# Patient Record
Sex: FEMALE | Race: WHITE | NOT HISPANIC OR LATINO | Employment: UNEMPLOYED | URBAN - METROPOLITAN AREA
[De-identification: names, ages, dates, MRNs, and addresses within clinical notes are randomized per-mention and may not be internally consistent; named-entity substitution may affect disease eponyms.]

---
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
## 2023-01-05 DIAGNOSIS — E06.3 HASHIMOTO'S THYROIDITIS: Primary | ICD-10-CM

## 2023-03-16 ENCOUNTER — LAB (OUTPATIENT)
Dept: LAB | Facility: CLINIC | Age: 18
End: 2023-03-16
Payer: COMMERCIAL

## 2023-03-16 DIAGNOSIS — E06.3 HASHIMOTO'S THYROIDITIS: ICD-10-CM

## 2023-03-16 LAB
BASOPHILS # BLD AUTO: 0 10E3/UL (ref 0–0.2)
BASOPHILS NFR BLD AUTO: 1 %
EOSINOPHIL # BLD AUTO: 0.3 10E3/UL (ref 0–0.7)
EOSINOPHIL NFR BLD AUTO: 4 %
ERYTHROCYTE [DISTWIDTH] IN BLOOD BY AUTOMATED COUNT: 14.3 % (ref 10–15)
HCT VFR BLD AUTO: 38.4 % (ref 35–47)
HGB BLD-MCNC: 11.6 G/DL (ref 11.7–15.7)
IMM GRANULOCYTES # BLD: 0 10E3/UL
IMM GRANULOCYTES NFR BLD: 0 %
LYMPHOCYTES # BLD AUTO: 1.9 10E3/UL (ref 0.8–5.3)
LYMPHOCYTES NFR BLD AUTO: 30 %
MCH RBC QN AUTO: 24.4 PG (ref 26.5–33)
MCHC RBC AUTO-ENTMCNC: 30.2 G/DL (ref 31.5–36.5)
MCV RBC AUTO: 81 FL (ref 78–100)
MONOCYTES # BLD AUTO: 0.4 10E3/UL (ref 0–1.3)
MONOCYTES NFR BLD AUTO: 6 %
NEUTROPHILS # BLD AUTO: 3.6 10E3/UL (ref 1.6–8.3)
NEUTROPHILS NFR BLD AUTO: 59 %
PLATELET # BLD AUTO: 297 10E3/UL (ref 150–450)
RBC # BLD AUTO: 4.75 10E6/UL (ref 3.8–5.2)
T4 FREE SERPL-MCNC: 1.06 NG/DL (ref 1–1.6)
TSH SERPL DL<=0.005 MIU/L-ACNC: 6.25 UIU/ML (ref 0.5–4.3)
WBC # BLD AUTO: 6.2 10E3/UL (ref 4–11)

## 2023-03-16 PROCEDURE — 84443 ASSAY THYROID STIM HORMONE: CPT

## 2023-03-16 PROCEDURE — 84439 ASSAY OF FREE THYROXINE: CPT

## 2023-03-16 PROCEDURE — 36415 COLL VENOUS BLD VENIPUNCTURE: CPT

## 2023-03-16 PROCEDURE — 85025 COMPLETE CBC W/AUTO DIFF WBC: CPT

## 2023-03-24 NOTE — RESULT ENCOUNTER NOTE
Jaime,  Your recent thyroid tests have moved in the opposite direction of what I expected (and desired) following the dose increase.  Are you sure you are taking your dose consistently, each day?  This would be a reason for your TSH level to increase even though we increased your dose.  Alternatively, if you changed the time that you take it or are now eating when taking the dose or taking a vitamin or other medication that can affect its absorption would all be other explanations.  Its not too far out of range, but before I increase your dose again, I want to be sure nothing has changed in your routine.  I dont want to put you on a dose that is too much.  If you have missed doses here and there, lets keep you on the same dose but use a pill counter and focus on consistent administration.  If you skip a day, double up the next.      Let me know.      Dr. Carter

## 2023-05-07 ENCOUNTER — HEALTH MAINTENANCE LETTER (OUTPATIENT)
Age: 18
End: 2023-05-07

## 2023-06-05 ENCOUNTER — MYC MEDICAL ADVICE (OUTPATIENT)
Dept: ENDOCRINOLOGY | Facility: CLINIC | Age: 18
End: 2023-06-05
Payer: COMMERCIAL

## 2023-06-05 DIAGNOSIS — E61.1 IRON DEFICIENCY: Primary | ICD-10-CM

## 2023-06-05 DIAGNOSIS — E06.3 HASHIMOTO'S THYROIDITIS: ICD-10-CM

## 2023-06-13 ENCOUNTER — LAB (OUTPATIENT)
Dept: LAB | Facility: CLINIC | Age: 18
End: 2023-06-13
Payer: COMMERCIAL

## 2023-06-13 DIAGNOSIS — E06.3 HASHIMOTO'S THYROIDITIS: ICD-10-CM

## 2023-06-13 DIAGNOSIS — E61.1 IRON DEFICIENCY: ICD-10-CM

## 2023-06-13 LAB
BASOPHILS # BLD AUTO: 0 10E3/UL (ref 0–0.2)
BASOPHILS NFR BLD AUTO: 1 %
EOSINOPHIL # BLD AUTO: 0.2 10E3/UL (ref 0–0.7)
EOSINOPHIL NFR BLD AUTO: 4 %
ERYTHROCYTE [DISTWIDTH] IN BLOOD BY AUTOMATED COUNT: 14.5 % (ref 10–15)
FERRITIN SERPL-MCNC: 12 NG/ML (ref 6–175)
HCT VFR BLD AUTO: 40.1 % (ref 35–47)
HGB BLD-MCNC: 12.1 G/DL (ref 11.7–15.7)
IMM GRANULOCYTES # BLD: 0 10E3/UL
IMM GRANULOCYTES NFR BLD: 0 %
IRON BINDING CAPACITY (ROCHE): 420 UG/DL (ref 240–430)
IRON SATN MFR SERPL: 6 % (ref 15–46)
IRON SERPL-MCNC: 26 UG/DL (ref 37–145)
LYMPHOCYTES # BLD AUTO: 2.1 10E3/UL (ref 0.8–5.3)
LYMPHOCYTES NFR BLD AUTO: 33 %
MCH RBC QN AUTO: 25.1 PG (ref 26.5–33)
MCHC RBC AUTO-ENTMCNC: 30.2 G/DL (ref 31.5–36.5)
MCV RBC AUTO: 83 FL (ref 78–100)
MONOCYTES # BLD AUTO: 0.5 10E3/UL (ref 0–1.3)
MONOCYTES NFR BLD AUTO: 7 %
NEUTROPHILS # BLD AUTO: 3.5 10E3/UL (ref 1.6–8.3)
NEUTROPHILS NFR BLD AUTO: 55 %
NRBC # BLD AUTO: 0 10E3/UL
NRBC BLD AUTO-RTO: 0 /100
PLATELET # BLD AUTO: 257 10E3/UL (ref 150–450)
RBC # BLD AUTO: 4.83 10E6/UL (ref 3.8–5.2)
T4 FREE SERPL-MCNC: 1.21 NG/DL (ref 1–1.6)
TSH SERPL DL<=0.005 MIU/L-ACNC: 4.74 UIU/ML (ref 0.5–4.3)
WBC # BLD AUTO: 6.3 10E3/UL (ref 4–11)

## 2023-06-13 PROCEDURE — 84443 ASSAY THYROID STIM HORMONE: CPT

## 2023-06-13 PROCEDURE — 82728 ASSAY OF FERRITIN: CPT

## 2023-06-13 PROCEDURE — 85025 COMPLETE CBC W/AUTO DIFF WBC: CPT

## 2023-06-13 PROCEDURE — 83540 ASSAY OF IRON: CPT

## 2023-06-13 PROCEDURE — 36415 COLL VENOUS BLD VENIPUNCTURE: CPT

## 2023-06-13 PROCEDURE — 83550 IRON BINDING TEST: CPT

## 2023-06-13 PROCEDURE — 84439 ASSAY OF FREE THYROXINE: CPT

## 2023-06-15 DIAGNOSIS — E06.3 HASHIMOTO'S THYROIDITIS: ICD-10-CM

## 2023-06-15 DIAGNOSIS — E61.1 IRON DEFICIENCY: ICD-10-CM

## 2023-06-15 RX ORDER — LEVOTHYROXINE SODIUM 137 UG/1
137 TABLET ORAL DAILY
Qty: 90 TABLET | Refills: 3 | Status: SHIPPED | OUTPATIENT
Start: 2023-06-15 | End: 2024-01-04

## 2023-06-15 RX ORDER — PNV NO.95/FERROUS FUM/FOLIC AC 28MG-0.8MG
1 TABLET ORAL DAILY
Qty: 90 TABLET | Refills: 1 | Status: SHIPPED | OUTPATIENT
Start: 2023-06-15

## 2023-06-15 NOTE — RESULT ENCOUNTER NOTE
Jaime,    Your thyroid levels are trending in the right direction on the 137 mcg tablet.  They are in a range I am comfortable continuing this dose.      Your iron levels have fallen again and are in a low range.  Although you do not have evidence for anemia at this point, that could occur without adequate replacement of your iron.  I would like for you to take a daily iron supplement for the next 3 months and then recheck your labs.  This could also be done before you leave for school or if you are home for the Thanksgiving holiday.  I placed orders for you.  I sent an iron prescription that I would like you to take to your pharmacy.  Please take it at the opposite time of day from your thyroid hormone.    Dr. Carter

## 2023-12-06 ENCOUNTER — TELEPHONE (OUTPATIENT)
Dept: ENDOCRINOLOGY | Facility: CLINIC | Age: 18
End: 2023-12-06

## 2023-12-13 DIAGNOSIS — E61.1 IRON DEFICIENCY: ICD-10-CM

## 2023-12-13 RX ORDER — PNV NO.95/FERROUS FUM/FOLIC AC 28MG-0.8MG
1 TABLET ORAL DAILY
Qty: 90 TABLET | Refills: 1 | OUTPATIENT
Start: 2023-12-13

## 2023-12-13 NOTE — TELEPHONE ENCOUNTER
Refill request received from: [facility]  Medication Requested:  Iron 65 mg tablet  Directions:1 tablet by mouth daily  Quantity:90  Last Office Visit: 7/22/2022  Next Appointment Scheduled for: 1/4/2024  Last refill:   Sent To:  RN or Provider

## 2024-01-01 ENCOUNTER — VIRTUAL VISIT (OUTPATIENT)
Dept: URGENT CARE | Facility: CLINIC | Age: 19
End: 2024-01-01
Payer: COMMERCIAL

## 2024-01-01 DIAGNOSIS — H10.9 BACTERIAL CONJUNCTIVITIS OF RIGHT EYE: Primary | ICD-10-CM

## 2024-01-01 PROCEDURE — 99203 OFFICE O/P NEW LOW 30 MIN: CPT | Mod: 95

## 2024-01-01 RX ORDER — POLYMYXIN B SULFATE AND TRIMETHOPRIM 1; 10000 MG/ML; [USP'U]/ML
1-2 SOLUTION OPHTHALMIC EVERY 4 HOURS
Qty: 10 ML | Refills: 0 | Status: SHIPPED | OUTPATIENT
Start: 2024-01-01

## 2024-01-01 NOTE — PATIENT INSTRUCTIONS
Use antibiotic eye drops as directed for 7 days.  Wipe away drainage before administering eye drops.  Wipe discharge away with wet paper towel and then discard.   Discharge should clear up in 72 hours; redness may continue several more days.  Out of activities for at least 24 hrs due to being contagious.  Do not wear make up until symptoms resolve. Throw away all make up used 24 hours prior to symptom onset.   Infection is spread by contact. Avoid touching eye as much as possible to avoid spreading the infection.  Wash hands regularly and sanitize items touched.  Wash previously used bath, face and hand towels. Do not share towels with others.  Wash pillow case and linens.   Go to urgent care or ER if eye pain, vision changes occur or sensitivity to light, fever/sweats/chills develop.

## 2024-01-01 NOTE — PROGRESS NOTES
Jaime is a 18 year old female who presents for a billable video visit.    ASSESSMENT/PLAN:  Diagnoses and all orders for this visit:    Bacterial conjunctivitis of right eye  -     polymixin b-trimethoprim (POLYTRIM) 08236-7.1 UNIT/ML-% ophthalmic solution; Place 1-2 drops into the right eye every 4 hours        Patient Instructions   Use antibiotic eye drops as directed for 7 days.  Wipe away drainage before administering eye drops.  Wipe discharge away with wet paper towel and then discard.   Discharge should clear up in 72 hours; redness may continue several more days.  Out of activities for at least 24 hrs due to being contagious.  Do not wear make up until symptoms resolve. Throw away all make up used 24 hours prior to symptom onset.   Infection is spread by contact. Avoid touching eye as much as possible to avoid spreading the infection.  Wash hands regularly and sanitize items touched.  Wash previously used bath, face and hand towels. Do not share towels with others.  Wash pillow case and linens.   Go to urgent care or ER if eye pain, vision changes occur or sensitivity to light, fever/sweats/chills develop.      SUBJECTIVE:  Patient reports that she has had cold symptoms for a few days of nasal congestion & cough. Yesterday she took a nap, when she woke upp she had some itching, drainage and crusting of the eyelashes of the right eye, drainage. The right eye is red. No pain in the eye, no vision changes, no sensitivity to light or no fever/sweats or chills.    OBJECTIVE:  Vitals not done due to this being a virtual visit    GENERAL: Healthy, alert and no distress  EYES: Left Eye grossly normal to inspection.  No discharge or erythema, or obvious scleral/conjunctival abnormalities. Right eye conjunctivae erythematous.   RESP: No audible wheeze, cough, or visible cyanosis.  No visible retractions or increased work of breathing.    SKIN: Visible skin clear. No significant rash, abnormal pigmentation or  lesions.  NEURO: Cranial nerves grossly intact.  Mentation and speech appropriate for age.  PSYCH: Mentation appears normal, affect normal/bright, judgement and insight intact, normal speech and appearance well-groomed.        Video-Visit Details    Type of service:  Video Visit  Video Start Time: 5:33PM  Video End Time:5:39 PM    Originating Location (pt. Location): Home    Distant Location (provider location):  [facility] URGENT CARE     Platform used for Video Visit: SHOSHANA Courtney, CNP

## 2024-01-04 ENCOUNTER — VIRTUAL VISIT (OUTPATIENT)
Dept: ENDOCRINOLOGY | Facility: CLINIC | Age: 19
End: 2024-01-04
Payer: COMMERCIAL

## 2024-01-04 VITALS — HEIGHT: 69 IN | WEIGHT: 210 LBS | BODY MASS INDEX: 31.1 KG/M2

## 2024-01-04 DIAGNOSIS — E61.1 IRON DEFICIENCY: Primary | ICD-10-CM

## 2024-01-04 DIAGNOSIS — E06.3 HASHIMOTO'S THYROIDITIS: ICD-10-CM

## 2024-01-04 PROCEDURE — 99214 OFFICE O/P EST MOD 30 MIN: CPT | Mod: 95 | Performed by: PEDIATRICS

## 2024-01-04 RX ORDER — LEVOTHYROXINE SODIUM 137 UG/1
137 TABLET ORAL DAILY
Qty: 90 TABLET | Refills: 3 | Status: SHIPPED | OUTPATIENT
Start: 2024-01-04

## 2024-01-04 ASSESSMENT — PAIN SCALES - GENERAL: PAINLEVEL: NO PAIN (0)

## 2024-01-04 NOTE — PATIENT INSTRUCTIONS
1.  Move thyroid hormone administration to right before you go to bed.  2.  Be consistent with taking of iron and sertraline  3. Follow-up labs over presidents day weekend (orders entered)  4. New prescription for 1 year  5. Follow-up with therapist once you are back at school  6.  Follow-up with me in 1 year.  Plan to transition to adult provider to manage thyroid during or just after senior year.  Will need to establish care with adult primary care as well.

## 2024-01-04 NOTE — PROGRESS NOTES
Pediatric Endocrinology Follow-up Visit    Patient: Jaime Delatorre MRN# [id number]   YOB: 2005 Age: 18year 1month old   Date of Visit: Jan 4, 2024    Dear Dr. Duane C Skar:    I had the pleasure of seeing your patient, Jaime Delatorre for a video visit in the Pediatric Endocrinology Clinic, [facility], on Jan 4, 2024 for follow-up of Hashimotos thyroiditis.           Problem list:     Patient Active Problem List    Diagnosis Date Noted    Recurrent major depressive disorder, in partial remission (H24)      Priority: Medium    Anxiety      Priority: Medium            HPI:   My initial consultation with Jaime was in January of 2021 (virtual).  She was seen for depression visit and started on SSRI back in November.  Had laboratory tests done as part of a routine screen as noted below.  She had improvement on zoloft including some improvement of her fatigue.  She had no other symptoms of hypothyroidism at that time.    I did some additional screens and idenitfied that she had evidence for autoimmune thyroiditis.  I started her at 75 mcg and increased her to 88 mcg in March after her labs showed a persistent elevation in TSH.  Her tests from June showed a further increase in her TSH and I bumped her to 100 mcg in mid June.  At our last visit, due to continued symptoms and hyperthryotropinemia from her labs in January of 2022, I increased her further to 112 mcg daily.  She has a history for palpitations thought it was not clear if these were related to anxiety rather than her thyroid levels.    She struggles to find the right time to take her medication.  For the past few months, she has not been on a consistent schedule.  She feels this has led to inconsistency to exposure.  She is noticing more fatigue and weight gain.  Reports that her fingers get tingling or numb intermittently.  She is taking sertraline, levothyroxine, and iron and will have symptoms if she doesn't take her meds.  When consistent  "this does not seem to happen.  Menses are regular.  Was on OCP but off for past two months.  Self weaned it.  She wants to see how she does off of it.  No cold intolerance.  No skin problems.  No constipation.  No neck symptoms.         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          Family History:   Father is  6 feet 1 inch tall.  Mother is  5 feet 2 inches tall.      No family history on file.    History of:  Thyroid disease: Mat Gma on thyroid since teenager; mom diagnosed with hypothyroidism in late 30s - had a goiter; maternal aunt with hypothyroidism  Celiac disease: None  No T1D  T2D: grandmother         Allergies:     Allergies   Allergen Reactions    Seasonal Allergies              Medications:     Current Outpatient Medications   Medication Sig Dispense Refill    Ferrous Sulfate (IRON) 325 (65 Fe) MG tablet Take 1 tablet by mouth daily 90 tablet 1    JUNEL FE 1.5/30 1.5-30 MG-MCG tablet Take 1 tablet by mouth daily      levothyroxine (SYNTHROID/LEVOTHROID) 137 MCG tablet Take 1 tablet (137 mcg) by mouth daily 90 tablet 3    polymixin b-trimethoprim (POLYTRIM) 85505-8.1 UNIT/ML-% ophthalmic solution Place 1-2 drops into the right eye every 4 hours 10 mL 0    sertraline (ZOLOFT) 25 MG tablet 1 TABLET (IN ADDITION TO 50MG TAB TO = 75MG) ORALLY ONCE A DAY 30 DAY(S)      sertraline (ZOLOFT) 50 MG tablet Take 50 mg by mouth daily             Review of Systems:   Gen: negative  Eye: Negative  ENT: Negative  Pulmonary:  Negative  Cardio: palpitations  Gastrointestinal: Negative  Hematologic: Negative  Genitourinary: Negative  Musculoskeletal: Negative  Psychiatric: Negative  Neurologic: no tremors  Skin: Negative  Endocrine: see HPI.            Physical Exam:   Height 1.753 m (5' 9.02\"), weight 95.3 kg (210 lb).  Growth %ile SmartLinks can only be used for patients less than 20 years " "old.  Height: 175.3 cm  (0\") Facility age limit for growth %justina is 20 years.  Weight: 95.3 kg (actual weight), Facility age limit for growth %justina is 20 years.  BMI: Body mass index is 31 kg/m . Facility age limit for growth %justina is 20 years.      GENERAL: healthy, alert, and crying during exam  EYES: Eyes grossly normal to inspection.  No discharge or erythema, or obvious scleral/conjunctival abnormalities.  RESP: No audible wheeze, cough, or visible cyanosis.  No visible retractions or increased work of breathing.    SKIN: Visible skin clear. No significant rash, abnormal pigmentation or lesions.  NEURO: Cranial nerves grossly intact.  Mentation and speech appropriate for age.  PSYCH: Mentation appears normal, affect normal/bright, judgement and insight intact, normal speech and appearance well-groomed.         Laboratory results:      Latest Reference Range & Units 06/13/23 15:36   Ferritin 6 - 175 ng/mL 12   Iron 37 - 145 ug/dL 26 (L)   Iron Binding Capacity 240 - 430 ug/dL 420   Iron Sat Index 15 - 46 % 6 (L)   T4 Free 1.00 - 1.60 ng/dL 1.21   TSH 0.50 - 4.30 uIU/mL 4.74 (H)   WBC 4.0 - 11.0 10e3/uL 6.3   Hemoglobin 11.7 - 15.7 g/dL 12.1   Hematocrit 35.0 - 47.0 % 40.1   Platelet Count 150 - 450 10e3/uL 257   RBC Count 3.80 - 5.20 10e6/uL 4.83   MCV 78 - 100 fL 83   MCH 26.5 - 33.0 pg 25.1 (L)   MCHC 31.5 - 36.5 g/dL 30.2 (L)   RDW 10.0 - 15.0 % 14.5   % Neutrophils % 55   % Lymphocytes % 33   % Monocytes % 7   % Eosinophils % 4   % Basophils % 1   Absolute Basophils 0.0 - 0.2 10e3/uL 0.0   Absolute Eosinophils 0.0 - 0.7 10e3/uL 0.2   Absolute Immature Granulocytes <=0.4 10e3/uL 0.0   Absolute Lymphocytes 0.8 - 5.3 10e3/uL 2.1   Absolute Monocytes 0.0 - 1.3 10e3/uL 0.5   % Immature Granulocytes % 0   Absolute Neutrophils 1.6 - 8.3 10e3/uL 3.5   Absolute NRBCs 10e3/uL 0.0   NRBCs per 100 WBC <1 /100 0   (L): Data is abnormally low  (H): Data is abnormally high    TSH   Date Value Ref Range Status   06/13/2023 " 4.74 (H) 0.50 - 4.30 uIU/mL Final   03/16/2023 6.25 (H) 0.50 - 4.30 uIU/mL Final   11/23/2022 5.81 (H) 0.50 - 4.30 uIU/mL Final   01/17/2022 5.88 (H) 0.40 - 4.00 mU/L Final   08/29/2021 5.59 (H) 0.40 - 4.00 mU/L Final   06/07/2021 12.70 (H) 0.40 - 4.00 mU/L Final   03/12/2021 5.34 (H) 0.40 - 4.00 mU/L Final   01/18/2021 11.22 (H) 0.40 - 4.00 mU/L Final     T4 Free   Date Value Ref Range Status   06/07/2021 0.86 0.76 - 1.46 ng/dL Final   03/12/2021 0.89 0.76 - 1.46 ng/dL Final   01/18/2021 0.73 (L) 0.76 - 1.46 ng/dL Final     Free T4   Date Value Ref Range Status   06/13/2023 1.21 1.00 - 1.60 ng/dL Final   03/16/2023 1.06 1.00 - 1.60 ng/dL Final   11/23/2022 1.15 1.00 - 1.60 ng/dL Final              Assessment and Plan:   Jaime is a 18 year old female with a history of depression, autoimmune thyroiditis, moderate microcytic anemia and low iron levels.  Jaime is clearly struggling with her mental health though she has not acute issues today, feels safe, and has a therapist back at school.  She has only been intermittently taking her thyroid hormone and we discussed some ways to be more consistent and clarified some misconceptions on timing of her thyroid hormone administration.  Prior to rechecking her levels, she needs to take her dose consistently.  I will recheck her counts and iron levels at that time as well.  WE discussed a transition plan over the next year for her care.     Orders Placed This Encounter   Procedures    TSH with free T4 reflex    Iron and iron binding capacity    Ferritin    CBC with platelets differential     Patient Instructions   1.  Move thyroid hormone administration to right before you go to bed.  2.  Be consistent with taking of iron and sertraline  3. Follow-up labs over presidents day weekend (orders entered)  4. New prescription for 1 year  5. Follow-up with therapist once you are back at school  6.  Follow-up with me in 1 year.  Plan to transition to adult provider to manage thyroid  during or just after senior year.  Will need to establish care with adult primary care as well.        Thank you for allowing me to participate in the care of your patient.  Please do not hesitate to call with questions or concerns.    Sincerely,      Joey Carter MD    Pager [phone]

## 2024-01-04 NOTE — LETTER
1/4/2024      RE: Jaime Delatorre  [address] [place] [address]     Dear Colleague,    Thank you for the opportunity to participate in the care of your patient, Jaime Delatorre, at the [facility]. Please see a copy of my visit note below.    Pediatric Endocrinology Follow-up Visit    Patient: Jaime Delatorre MRN# [id number]   YOB: 2005 Age: 18year 1month old   Date of Visit: Jan 4, 2024    Dear Dr. Duane C Skar:    I had the pleasure of seeing your patient, Jaime Delatorre for a video visit in the Pediatric Endocrinology Clinic, [facility], on Jan 4, 2024 for follow-up of Hashimotos thyroiditis.           Problem list:     Patient Active Problem List    Diagnosis Date Noted     Recurrent major depressive disorder, in partial remission (H24)      Priority: Medium     Anxiety      Priority: Medium            HPI:   My initial consultation with Jaime was in January of 2021 (virtual).  She was seen for depression visit and started on SSRI back in November.  Had laboratory tests done as part of a routine screen as noted below.  She had improvement on zoloft including some improvement of her fatigue.  She had no other symptoms of hypothyroidism at that time.    I did some additional screens and idenitfied that she had evidence for autoimmune thyroiditis.  I started her at 75 mcg and increased her to 88 mcg in March after her labs showed a persistent elevation in TSH.  Her tests from June showed a further increase in her TSH and I bumped her to 100 mcg in mid June.  At our last visit, due to continued symptoms and hyperthryotropinemia from her labs in January of 2022, I increased her further to 112 mcg daily.  She has a history for palpitations thought it was not clear if these were related to anxiety rather than her thyroid levels.    She struggles to find the right time to take her  medication.  For the past few months, she has not been on a consistent schedule.  She feels this has led to inconsistency to exposure.  She is noticing more fatigue and weight gain.  Reports that her fingers get tingling or numb intermittently.  She is taking sertraline, levothyroxine, and iron and will have symptoms if she doesn't take her meds.  When consistent this does not seem to happen.  Menses are regular.  Was on OCP but off for past two months.  Self weaned it.  She wants to see how she does off of it.  No cold intolerance.  No skin problems.  No constipation.  No neck symptoms.         Social History:     Social History     Social History Narrative     Not on file     2nd year at [employer]  English and [employer]  Has counselor that she sees virtually.          Family History:   Father is  6 feet 1 inch tall.  Mother is  5 feet 2 inches tall.      No family history on file.    History of:  Thyroid disease: Mat Gma on thyroid since teenager; mom diagnosed with hypothyroidism in late 30s - had a goiter; maternal aunt with hypothyroidism  Celiac disease: None  No T1D  T2D: grandmother         Allergies:     Allergies   Allergen Reactions     Seasonal Allergies              Medications:     Current Outpatient Medications   Medication Sig Dispense Refill     Ferrous Sulfate (IRON) 325 (65 Fe) MG tablet Take 1 tablet by mouth daily 90 tablet 1     JUNEL FE 1.5/30 1.5-30 MG-MCG tablet Take 1 tablet by mouth daily       levothyroxine (SYNTHROID/LEVOTHROID) 137 MCG tablet Take 1 tablet (137 mcg) by mouth daily 90 tablet 3     polymixin b-trimethoprim (POLYTRIM) 51964-4.1 UNIT/ML-% ophthalmic solution Place 1-2 drops into the right eye every 4 hours 10 mL 0     sertraline (ZOLOFT) 25 MG tablet 1 TABLET (IN ADDITION TO 50MG TAB TO = 75MG) ORALLY ONCE A DAY 30 DAY(S)       sertraline (ZOLOFT) 50 MG tablet Take 50 mg by mouth daily             Review of Systems:   Gen: negative  Eye: Negative  ENT: Negative  Pulmonary:   "Negative  Cardio: palpitations  Gastrointestinal: Negative  Hematologic: Negative  Genitourinary: Negative  Musculoskeletal: Negative  Psychiatric: Negative  Neurologic: no tremors  Skin: Negative  Endocrine: see HPI.            Physical Exam:   Height 1.753 m (5' 9.02\"), weight 95.3 kg (210 lb).  Growth %ile SmartLinks can only be used for patients less than 20 years old.  Height: 175.3 cm  (0\") Facility age limit for growth %justina is 20 years.  Weight: 95.3 kg (actual weight), Facility age limit for growth %justina is 20 years.  BMI: Body mass index is 31 kg/m . Facility age limit for growth %justina is 20 years.      GENERAL: healthy, alert, and crying during exam  EYES: Eyes grossly normal to inspection.  No discharge or erythema, or obvious scleral/conjunctival abnormalities.  RESP: No audible wheeze, cough, or visible cyanosis.  No visible retractions or increased work of breathing.    SKIN: Visible skin clear. No significant rash, abnormal pigmentation or lesions.  NEURO: Cranial nerves grossly intact.  Mentation and speech appropriate for age.  PSYCH: Mentation appears normal, affect normal/bright, judgement and insight intact, normal speech and appearance well-groomed.         Laboratory results:      Latest Reference Range & Units 06/13/23 15:36   Ferritin 6 - 175 ng/mL 12   Iron 37 - 145 ug/dL 26 (L)   Iron Binding Capacity 240 - 430 ug/dL 420   Iron Sat Index 15 - 46 % 6 (L)   T4 Free 1.00 - 1.60 ng/dL 1.21   TSH 0.50 - 4.30 uIU/mL 4.74 (H)   WBC 4.0 - 11.0 10e3/uL 6.3   Hemoglobin 11.7 - 15.7 g/dL 12.1   Hematocrit 35.0 - 47.0 % 40.1   Platelet Count 150 - 450 10e3/uL 257   RBC Count 3.80 - 5.20 10e6/uL 4.83   MCV 78 - 100 fL 83   MCH 26.5 - 33.0 pg 25.1 (L)   MCHC 31.5 - 36.5 g/dL 30.2 (L)   RDW 10.0 - 15.0 % 14.5   % Neutrophils % 55   % Lymphocytes % 33   % Monocytes % 7   % Eosinophils % 4   % Basophils % 1   Absolute Basophils 0.0 - 0.2 10e3/uL 0.0   Absolute Eosinophils 0.0 - 0.7 10e3/uL 0.2   Absolute " Immature Granulocytes <=0.4 10e3/uL 0.0   Absolute Lymphocytes 0.8 - 5.3 10e3/uL 2.1   Absolute Monocytes 0.0 - 1.3 10e3/uL 0.5   % Immature Granulocytes % 0   Absolute Neutrophils 1.6 - 8.3 10e3/uL 3.5   Absolute NRBCs 10e3/uL 0.0   NRBCs per 100 WBC <1 /100 0   (L): Data is abnormally low  (H): Data is abnormally high    TSH   Date Value Ref Range Status   06/13/2023 4.74 (H) 0.50 - 4.30 uIU/mL Final   03/16/2023 6.25 (H) 0.50 - 4.30 uIU/mL Final   11/23/2022 5.81 (H) 0.50 - 4.30 uIU/mL Final   01/17/2022 5.88 (H) 0.40 - 4.00 mU/L Final   08/29/2021 5.59 (H) 0.40 - 4.00 mU/L Final   06/07/2021 12.70 (H) 0.40 - 4.00 mU/L Final   03/12/2021 5.34 (H) 0.40 - 4.00 mU/L Final   01/18/2021 11.22 (H) 0.40 - 4.00 mU/L Final     T4 Free   Date Value Ref Range Status   06/07/2021 0.86 0.76 - 1.46 ng/dL Final   03/12/2021 0.89 0.76 - 1.46 ng/dL Final   01/18/2021 0.73 (L) 0.76 - 1.46 ng/dL Final     Free T4   Date Value Ref Range Status   06/13/2023 1.21 1.00 - 1.60 ng/dL Final   03/16/2023 1.06 1.00 - 1.60 ng/dL Final   11/23/2022 1.15 1.00 - 1.60 ng/dL Final              Assessment and Plan:   Jaime is a 18 year old female with a history of depression, autoimmune thyroiditis, moderate microcytic anemia and low iron levels.  Jaime is clearly struggling with her mental health though she has not acute issues today, feels safe, and has a therapist back at school.  She has only been intermittently taking her thyroid hormone and we discussed some ways to be more consistent and clarified some misconceptions on timing of her thyroid hormone administration.  Prior to rechecking her levels, she needs to take her dose consistently.  I will recheck her counts and iron levels at that time as well.  WE discussed a transition plan over the next year for her care.     Orders Placed This Encounter   Procedures     TSH with free T4 reflex     Iron and iron binding capacity     Ferritin     CBC with platelets differential     Patient  Instructions   1.  Move thyroid hormone administration to right before you go to bed.  2.  Be consistent with taking of iron and sertraline  3. Follow-up labs over presidents day weekend (orders entered)  4. New prescription for 1 year  5. Follow-up with therapist once you are back at school  6.  Follow-up with me in 1 year.  Plan to transition to adult provider to manage thyroid during or just after senior year.  Will need to establish care with adult primary care as well.        Thank you for allowing me to participate in the care of your patient.  Please do not hesitate to call with questions or concerns.    Sincerely,      Joey Carter MD    Pager [phone]

## 2024-01-04 NOTE — NURSING NOTE
Is the patient currently in the state of [place]? YES    Visit mode:VIDEO    If the visit is dropped, the patient can be reconnected by: telephone    Will anyone else be joining the visit? NO  (If patient encounters technical issues they should call [phone]    How would you like to obtain your AVS? MyChart    Are changes needed to the allergy or medication list? No    Reason for visit: Follow Up (Hashimoto's thyroiditis//)    REINA JOHNSON LPN LPN

## 2024-02-25 ENCOUNTER — HEALTH MAINTENANCE LETTER (OUTPATIENT)
Age: 19
End: 2024-02-25

## 2024-08-26 ENCOUNTER — LAB (OUTPATIENT)
Dept: LAB | Facility: CLINIC | Age: 19
End: 2024-08-26
Payer: COMMERCIAL

## 2024-08-26 DIAGNOSIS — E06.3 HASHIMOTO'S THYROIDITIS: ICD-10-CM

## 2024-08-26 DIAGNOSIS — E61.1 IRON DEFICIENCY: ICD-10-CM

## 2024-08-26 LAB
BASOPHILS # BLD AUTO: 0.1 10E3/UL (ref 0–0.2)
BASOPHILS NFR BLD AUTO: 1 %
EOSINOPHIL # BLD AUTO: 0.4 10E3/UL (ref 0–0.7)
EOSINOPHIL NFR BLD AUTO: 5 %
ERYTHROCYTE [DISTWIDTH] IN BLOOD BY AUTOMATED COUNT: 13.1 % (ref 10–15)
FERRITIN SERPL-MCNC: 53 NG/ML (ref 6–175)
HCT VFR BLD AUTO: 41.6 % (ref 35–47)
HGB BLD-MCNC: 13 G/DL (ref 11.7–15.7)
IMM GRANULOCYTES # BLD: 0 10E3/UL
IMM GRANULOCYTES NFR BLD: 0 %
IRON BINDING CAPACITY (ROCHE): 318 UG/DL (ref 240–430)
IRON SATN MFR SERPL: 17 % (ref 15–46)
IRON SERPL-MCNC: 54 UG/DL (ref 37–145)
LYMPHOCYTES # BLD AUTO: 2.1 10E3/UL (ref 0.8–5.3)
LYMPHOCYTES NFR BLD AUTO: 29 %
MCH RBC QN AUTO: 28.7 PG (ref 26.5–33)
MCHC RBC AUTO-ENTMCNC: 31.3 G/DL (ref 31.5–36.5)
MCV RBC AUTO: 92 FL (ref 78–100)
MONOCYTES # BLD AUTO: 0.4 10E3/UL (ref 0–1.3)
MONOCYTES NFR BLD AUTO: 6 %
NEUTROPHILS # BLD AUTO: 4.1 10E3/UL (ref 1.6–8.3)
NEUTROPHILS NFR BLD AUTO: 58 %
PLATELET # BLD AUTO: 248 10E3/UL (ref 150–450)
RBC # BLD AUTO: 4.53 10E6/UL (ref 3.8–5.2)
T4 FREE SERPL-MCNC: 0.41 NG/DL (ref 0.9–1.7)
TSH SERPL DL<=0.005 MIU/L-ACNC: 117 UIU/ML (ref 0.3–4.2)
WBC # BLD AUTO: 7.1 10E3/UL (ref 4–11)

## 2024-08-26 PROCEDURE — 83540 ASSAY OF IRON: CPT

## 2024-08-26 PROCEDURE — 84439 ASSAY OF FREE THYROXINE: CPT

## 2024-08-26 PROCEDURE — 82728 ASSAY OF FERRITIN: CPT

## 2024-08-26 PROCEDURE — 36415 COLL VENOUS BLD VENIPUNCTURE: CPT

## 2024-08-26 PROCEDURE — 84443 ASSAY THYROID STIM HORMONE: CPT

## 2024-08-26 PROCEDURE — 83550 IRON BINDING TEST: CPT

## 2024-08-26 PROCEDURE — 85025 COMPLETE CBC W/AUTO DIFF WBC: CPT

## 2024-08-27 NOTE — RESULT ENCOUNTER NOTE
Jaime.    Your thyroid levels are way off indicating you are not taking your thyroid hormone dose.  I would expect this may be causing you some symptoms as your levels are in a profoundly hypothyroid range.  I would recommend restarting at a half tablet daily for a week and then increase to a full tablet daily.  Its important you are taking this on a daily basis.  Once you have been back on your full dose for 2 months, I would like to recheck your test results.    Please let me know if you have any questions.    Dr. Carter

## 2024-10-17 ENCOUNTER — TELEPHONE (OUTPATIENT)
Dept: ENDOCRINOLOGY | Facility: CLINIC | Age: 19
End: 2024-10-17
Payer: COMMERCIAL

## 2024-10-17 DIAGNOSIS — E06.3 HASHIMOTO'S THYROIDITIS: Primary | ICD-10-CM

## 2024-10-17 NOTE — TELEPHONE ENCOUNTER
Health Call Center    Phone Message    May a detailed message be left on voicemail: yes     Reason for Call: Order(s): Other:   Reason for requested: Parent scheduled the patient's next Peds Endo appt for a time she will be back in the state from college. Patient will also be here in December and January but the provider is booked up. Parent is hoping labs can at least be ordered so they can complete them at an [facility] clinic when patient is home in December/January   Date needed: by December   Provider name: Dr. Carter    Action Taken: Message routed to:  Other: Peds Endo    Travel Screening: Not Applicable     Date of Service:

## 2024-10-28 NOTE — TELEPHONE ENCOUNTER
Called mom back.  Let her know lab orders are in, so they can schedule when she is back for winter break. Mom verbalized understanding and will call back with any questions or concerns.

## 2025-02-15 ENCOUNTER — HEALTH MAINTENANCE LETTER (OUTPATIENT)
Age: 20
End: 2025-02-15

## 2025-05-29 ENCOUNTER — LAB (OUTPATIENT)
Dept: LAB | Facility: CLINIC | Age: 20
End: 2025-05-29
Payer: COMMERCIAL

## 2025-05-29 DIAGNOSIS — E06.3 HASHIMOTO'S THYROIDITIS: Primary | ICD-10-CM

## 2025-05-29 DIAGNOSIS — E06.3 HASHIMOTO'S THYROIDITIS: ICD-10-CM

## 2025-05-29 LAB
T4 FREE SERPL-MCNC: 1.13 NG/DL (ref 0.9–1.7)
TSH SERPL DL<=0.005 MIU/L-ACNC: 22 UIU/ML (ref 0.3–4.2)

## 2025-05-29 PROCEDURE — 36415 COLL VENOUS BLD VENIPUNCTURE: CPT

## 2025-05-29 PROCEDURE — 84439 ASSAY OF FREE THYROXINE: CPT

## 2025-05-29 PROCEDURE — 84443 ASSAY THYROID STIM HORMONE: CPT

## 2025-06-02 ENCOUNTER — PATIENT OUTREACH (OUTPATIENT)
Dept: CARE COORDINATION | Facility: CLINIC | Age: 20
End: 2025-06-02
Payer: COMMERCIAL

## 2025-06-03 ENCOUNTER — RESULTS FOLLOW-UP (OUTPATIENT)
Dept: PEDIATRICS | Facility: CLINIC | Age: 20
End: 2025-06-03

## 2025-06-04 ENCOUNTER — PATIENT OUTREACH (OUTPATIENT)
Dept: CARE COORDINATION | Facility: CLINIC | Age: 20
End: 2025-06-04
Payer: COMMERCIAL

## 2025-06-26 ENCOUNTER — PATIENT OUTREACH (OUTPATIENT)
Dept: CARE COORDINATION | Facility: CLINIC | Age: 20
End: 2025-06-26
Payer: COMMERCIAL

## 2025-06-30 ENCOUNTER — PATIENT OUTREACH (OUTPATIENT)
Dept: CARE COORDINATION | Facility: CLINIC | Age: 20
End: 2025-06-30
Payer: COMMERCIAL